# Patient Record
Sex: MALE | Race: WHITE
[De-identification: names, ages, dates, MRNs, and addresses within clinical notes are randomized per-mention and may not be internally consistent; named-entity substitution may affect disease eponyms.]

---

## 2017-07-25 ENCOUNTER — HOSPITAL ENCOUNTER (OUTPATIENT)
Dept: HOSPITAL 53 - M SDC | Age: 37
Discharge: HOME | End: 2017-07-25
Attending: OPHTHALMOLOGY
Payer: COMMERCIAL

## 2017-07-25 VITALS — BODY MASS INDEX: 30.06 KG/M2 | WEIGHT: 210 LBS | HEIGHT: 70 IN

## 2017-07-25 VITALS — DIASTOLIC BLOOD PRESSURE: 91 MMHG | SYSTOLIC BLOOD PRESSURE: 136 MMHG
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
## 2017-07-25 DIAGNOSIS — T86.849: ICD-10-CM

## 2017-07-25 DIAGNOSIS — E78.5: ICD-10-CM

## 2017-07-25 DIAGNOSIS — Z79.899: ICD-10-CM
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
## 2017-07-25 DIAGNOSIS — T85.398A: Primary | ICD-10-CM

## 2017-07-25 DIAGNOSIS — E11.9: ICD-10-CM

## 2017-07-27 NOTE — RO
DATE OF PROCEDURE:  07/25/2017

 

PREOPERATIVE DIAGNOSIS:  Failed graft left eye.

 

POSTOPERATIVE DIAGNOSIS:  Failed graft left eye.

 

PROCEDURE:  Penetrating keratoplasty and removal of the old failed graft.

 

SURGEON:  Dr. Bipin Jimenez

 

ASSISTANT:  None.

 

ANESTHESIA:  General.

 

COMPLICATIONS:  None.

 

PROCEDURE IN DETAIL:  Patient was brought to the operating room, laid in supine

position.  The left eye was prepped and draped in a sterile fashion for

ophthalmic surgery, and patient was intubated.  Preoperatively, patient received

mannitol and Diamox in the standard dosage.  After the lid speculum was placed,

the cornea was marked with a 12 corneal marker.  Following this, the previously

placed interrupted and continuous nylon sutures were removed with the help of the

super sharp blade and tying forceps.  After that, the previous failed graft was

teased from the host cornea with the help of the crow blade, corneal scissors

to the right and the left, and 0.12 forceps.  The new tissue was splayed over

EndoCoat on the patient's host bed and secured in place using 12 interrupted

#10-0 nylon sutures.  All the knots were buried.  A #10-0 nylon suture was then

used then used to place in an anti-torque fashion with bites in between the

interrupted sutures.  Laxity was removed, and knot was buried.  Throughout the

procedure, anterior chamber was maintained using balanced salt solution (BSS).

At the end of the case, sub-Tenon's Kenalog was given, TobraDex ointment was

applied, eye was patched, and patient returned to recovery room in stable

condition.

## 2018-12-05 ENCOUNTER — HOSPITAL ENCOUNTER (OUTPATIENT)
Dept: HOSPITAL 53 - M SDC | Age: 38
Discharge: HOME | End: 2018-12-05
Attending: OPHTHALMOLOGY
Payer: OTHER GOVERNMENT

## 2018-12-05 DIAGNOSIS — T86.841: Primary | ICD-10-CM

## 2018-12-05 DIAGNOSIS — E66.9: ICD-10-CM

## 2018-12-05 DIAGNOSIS — I10: ICD-10-CM

## 2018-12-05 DIAGNOSIS — E11.9: ICD-10-CM

## 2018-12-05 DIAGNOSIS — Z79.899: ICD-10-CM

## 2018-12-05 PROCEDURE — 65750 CORNEAL TRANSPLANT: CPT

## 2018-12-05 RX ADMIN — POVIDONE-IODINE 1 DROP: 5 SOLUTION OPHTHALMIC at 10:04

## 2018-12-05 RX ADMIN — Medication 1 EA: at 09:00

## 2018-12-05 RX ADMIN — BALANCED SALT SOLUTION 1 ML: 6.4; .75; .48; .3; 3.9; 1.7 SOLUTION OPHTHALMIC at 09:00

## 2018-12-05 RX ADMIN — LIDOCAINE HYDROCHLORIDE 1 %: 35 GEL OPHTHALMIC at 07:45

## 2018-12-05 RX ADMIN — PHENYLEPHRINE HYDROCHLORIDE 1 DROP: 25 SOLUTION/ DROPS OPHTHALMIC at 07:45

## 2018-12-05 RX ADMIN — BETAMETHASONE SODIUM PHOSPHATE AND BETAMETHASONE ACETATE 1 MG: 3; 3 INJECTION, SUSPENSION INTRA-ARTICULAR; INTRALESIONAL; INTRAMUSCULAR at 09:00

## 2018-12-05 RX ADMIN — DEXTROSE MONOHYDRATE 1 MG: 50 INJECTION, SOLUTION INTRAVENOUS at 09:00

## 2018-12-05 RX ADMIN — TROPICAMIDE 1 DROP: 10 SOLUTION/ DROPS OPHTHALMIC at 07:45

## 2018-12-05 RX ADMIN — MANNITOL 1 MLS/HR: 20 INJECTION, SOLUTION INTRAVENOUS at 07:45

## 2018-12-05 RX ADMIN — ACETAZOLAMIDE SODIUM 1 MG: 500 INJECTION, POWDER, LYOPHILIZED, FOR SOLUTION INTRAVENOUS at 08:29

## 2018-12-05 RX ADMIN — TOBRAMYCIN AND DEXAMETHASONE 1 DOSE: 3; 1 OINTMENT OPHTHALMIC at 09:30

## 2018-12-05 RX ADMIN — OFLOXACIN 1 DROP: 3 SOLUTION/ DROPS OPHTHALMIC at 07:45

## 2018-12-05 RX ADMIN — CYCLOPENTOLATE HYDROCHLORIDE 1 DROP: 20 SOLUTION/ DROPS OPHTHALMIC at 07:45
